# Patient Record
Sex: FEMALE | Race: WHITE | URBAN - METROPOLITAN AREA
[De-identification: names, ages, dates, MRNs, and addresses within clinical notes are randomized per-mention and may not be internally consistent; named-entity substitution may affect disease eponyms.]

---

## 2024-12-05 ENCOUNTER — APPOINTMENT (OUTPATIENT)
Dept: RADIOLOGY | Facility: HOSPITAL | Age: 39
End: 2024-12-05
Payer: COMMERCIAL

## 2024-12-05 DIAGNOSIS — Z12.31 SCREENING MAMMOGRAM FOR BREAST CANCER: ICD-10-CM

## 2024-12-23 ENCOUNTER — APPOINTMENT (OUTPATIENT)
Dept: PRIMARY CARE | Facility: CLINIC | Age: 39
End: 2024-12-23
Payer: COMMERCIAL

## 2025-01-06 ENCOUNTER — APPOINTMENT (OUTPATIENT)
Dept: OBSTETRICS AND GYNECOLOGY | Facility: CLINIC | Age: 40
End: 2025-01-06
Payer: COMMERCIAL

## 2025-01-07 ENCOUNTER — APPOINTMENT (OUTPATIENT)
Dept: PRIMARY CARE | Facility: CLINIC | Age: 40
End: 2025-01-07
Payer: COMMERCIAL

## 2025-01-28 ENCOUNTER — APPOINTMENT (OUTPATIENT)
Dept: PRIMARY CARE | Facility: CLINIC | Age: 40
End: 2025-01-28
Payer: COMMERCIAL

## 2025-01-28 VITALS
BODY MASS INDEX: 21.56 KG/M2 | HEART RATE: 76 BPM | WEIGHT: 154 LBS | TEMPERATURE: 97.1 F | HEIGHT: 71 IN | DIASTOLIC BLOOD PRESSURE: 64 MMHG | SYSTOLIC BLOOD PRESSURE: 108 MMHG

## 2025-01-28 DIAGNOSIS — F41.9 ANXIETY: ICD-10-CM

## 2025-01-28 DIAGNOSIS — Z76.89 ENCOUNTER TO ESTABLISH CARE: Primary | ICD-10-CM

## 2025-01-28 DIAGNOSIS — R05.1 ACUTE COUGH: ICD-10-CM

## 2025-01-28 PROCEDURE — 99203 OFFICE O/P NEW LOW 30 MIN: CPT | Performed by: INTERNAL MEDICINE

## 2025-01-28 PROCEDURE — 3008F BODY MASS INDEX DOCD: CPT | Performed by: INTERNAL MEDICINE

## 2025-01-28 PROCEDURE — 1036F TOBACCO NON-USER: CPT | Performed by: INTERNAL MEDICINE

## 2025-01-28 ASSESSMENT — ANXIETY QUESTIONNAIRES
GAD7 TOTAL SCORE: 3
3. WORRYING TOO MUCH ABOUT DIFFERENT THINGS: NOT AT ALL
5. BEING SO RESTLESS THAT IT IS HARD TO SIT STILL: SEVERAL DAYS
2. NOT BEING ABLE TO STOP OR CONTROL WORRYING: NOT AT ALL
6. BECOMING EASILY ANNOYED OR IRRITABLE: NOT AT ALL
1. FEELING NERVOUS, ANXIOUS, OR ON EDGE: SEVERAL DAYS
7. FEELING AFRAID AS IF SOMETHING AWFUL MIGHT HAPPEN: SEVERAL DAYS
4. TROUBLE RELAXING: NOT AT ALL

## 2025-01-28 ASSESSMENT — ENCOUNTER SYMPTOMS
HEADACHES: 0
CONFUSION: 0
BACK PAIN: 0
NECK PAIN: 0
SINUS PAIN: 0
FREQUENCY: 0
FATIGUE: 0
DIZZINESS: 0
COUGH: 1
FEVER: 0
CONSTIPATION: 0
SHORTNESS OF BREATH: 0
BLOOD IN STOOL: 0
PALPITATIONS: 0
SORE THROAT: 0
NERVOUS/ANXIOUS: 1
DIARRHEA: 0
CHILLS: 0
DYSURIA: 0
MYALGIAS: 0
ABDOMINAL PAIN: 0
WEAKNESS: 0
ARTHRALGIAS: 0

## 2025-01-28 NOTE — PROGRESS NOTES
"Subjective   Patient ID: Jennifer Jackson is a 40 y.o. female who presents for New Patient Visit (Pt present today to [facility]. raz).    HPI Ms. Jackson is a 40-year-old female seen in office today for establishing care.  She recently moved from [facility] to [facility] with her  and daughter.  While traveling she has noted anxiety, almost panic attack pacifically during flying.  Her activities are skiing on a regular basis where she is feeling more scared and anxious.  She has a history of mild anxiety.  She has not taken any medications.  She is also experiencing cough for last 1 week.  Her  had cold symptoms about a week ago.  She denies any shortness of breath.  No fever or SOB.  She has appointment to see GYN.  Contraception-IUD present  SWETHA-7--3  She lives with her  and daughter.  She works as a mental health therapist.    Review of Systems   Constitutional:  Negative for chills, fatigue and fever.   HENT:  Negative for congestion, sinus pain and sore throat.    Respiratory:  Positive for cough. Negative for shortness of breath.    Cardiovascular:  Negative for chest pain, palpitations and leg swelling.   Gastrointestinal:  Negative for abdominal pain, blood in stool, constipation and diarrhea.   Genitourinary:  Negative for dysuria and frequency.   Musculoskeletal:  Negative for arthralgias, back pain, myalgias and neck pain.   Neurological:  Negative for dizziness, weakness and headaches.   Psychiatric/Behavioral:  Negative for confusion. The patient is nervous/anxious.        Objective   /64   Pulse 76   Temp 36.2 °C (97.1 °F)   Ht 1.803 m (5' 11\")   Wt 69.9 kg (154 lb)   LMP 01/21/2025 (Approximate)   BMI 21.48 kg/m²     Physical Exam  Vitals reviewed.   Constitutional:       General: She is not in acute distress.     Appearance: Normal appearance.   HENT:      Head: Normocephalic and atraumatic.      Mouth/Throat:      Mouth: Mucous membranes are moist.      Pharynx: " Posterior oropharyngeal erythema present.   Eyes:      Extraocular Movements: Extraocular movements intact.      Conjunctiva/sclera: Conjunctivae normal.      Pupils: Pupils are equal, round, and reactive to light.   Cardiovascular:      Rate and Rhythm: Normal rate and regular rhythm.      Pulses: Normal pulses.   Pulmonary:      Effort: Pulmonary effort is normal. No respiratory distress.      Breath sounds: Normal breath sounds.   Abdominal:      General: Bowel sounds are normal. There is no distension.      Tenderness: There is no abdominal tenderness.   Musculoskeletal:         General: No swelling or tenderness. Normal range of motion.      Cervical back: Normal range of motion.   Skin:     General: Skin is warm.   Neurological:      General: No focal deficit present.      Mental Status: She is alert.      Coordination: Coordination normal.      Gait: Gait normal.   Psychiatric:         Mood and Affect: Mood normal.         Behavior: Behavior normal.         Assessment/Plan   Diagnoses and all orders for this visit:  Encounter to establish care  Comments:  Seen for establishing care  Anxiety  Comments:  SWETHA-7 completed-3--mild anxiety  No need for medications  Try natural supplements like ashwagandha to help with anxiety  Acute cough  Comments:  Take Mucinex or Robitussin    Follow-up as needed or in year

## 2025-01-28 NOTE — PATIENT INSTRUCTIONS
You are seen today for establishing care  You have mild anxiety.  You could try natural supplements and exercise to improve your symptoms.  Follow-up with your GYN  Take Robitussin or Mucinex for cough  Follow-up in our office in 1 year or sooner if needed

## 2025-01-31 ENCOUNTER — OFFICE VISIT (OUTPATIENT)
Dept: URGENT CARE | Age: 40
End: 2025-01-31
Payer: COMMERCIAL

## 2025-01-31 ENCOUNTER — HOSPITAL ENCOUNTER (OUTPATIENT)
Dept: RADIOLOGY | Facility: CLINIC | Age: 40
Discharge: HOME | End: 2025-01-31
Payer: COMMERCIAL

## 2025-01-31 VITALS
WEIGHT: 152 LBS | HEART RATE: 88 BPM | OXYGEN SATURATION: 98 % | BODY MASS INDEX: 21.2 KG/M2 | DIASTOLIC BLOOD PRESSURE: 70 MMHG | RESPIRATION RATE: 18 BRPM | SYSTOLIC BLOOD PRESSURE: 109 MMHG | TEMPERATURE: 98.1 F

## 2025-01-31 DIAGNOSIS — R68.89 FLU-LIKE SYMPTOMS: ICD-10-CM

## 2025-01-31 DIAGNOSIS — J10.1 INFLUENZA A: Primary | ICD-10-CM

## 2025-01-31 LAB
POC RAPID INFLUENZA A: POSITIVE
POC RAPID INFLUENZA B: NEGATIVE

## 2025-01-31 PROCEDURE — 71046 X-RAY EXAM CHEST 2 VIEWS: CPT

## 2025-01-31 RX ORDER — BENZONATATE 200 MG/1
200 CAPSULE ORAL 3 TIMES DAILY PRN
Qty: 20 CAPSULE | Refills: 0 | Status: SHIPPED | OUTPATIENT
Start: 2025-01-31 | End: 2025-02-07

## 2025-01-31 RX ORDER — OSELTAMIVIR PHOSPHATE 75 MG/1
75 CAPSULE ORAL EVERY 12 HOURS
Qty: 10 CAPSULE | Refills: 0 | Status: SHIPPED | OUTPATIENT
Start: 2025-01-31 | End: 2025-02-05

## 2025-01-31 ASSESSMENT — PATIENT HEALTH QUESTIONNAIRE - PHQ9
SUM OF ALL RESPONSES TO PHQ9 QUESTIONS 1 AND 2: 0
2. FEELING DOWN, DEPRESSED OR HOPELESS: NOT AT ALL
1. LITTLE INTEREST OR PLEASURE IN DOING THINGS: NOT AT ALL

## 2025-01-31 NOTE — PROGRESS NOTES
"Subjective   Patient ID: Osiel Jackson \"Jennifer\" is a 40 y.o. female. They present today with a chief complaint of URI (Patient here for having the possible cold or flu for 1 week ).    History of Present Illness  HPI    40-year-old healthy female here for flulike illness.  Patient states symptoms started as sore throat, headache and nasal congestion around 8 days ago.  In the last 2 days she has felt acutely worse, feel the cold go down in her chest, now having coughing, chest congestion and fever intermittently in the evening.  Last night she felt somewhat short of breath.  Home COVID test done 5 days ago and it came back negative.    Patient states no recent travel however she wishes to travel to [place] as her sister recently passed away.  She denies pregnancy, LMP 1/21/2025.  She is not on any regular medications.      Past Medical History  Allergies as of 01/31/2025    (No Known Allergies)       (Not in a hospital admission)       History reviewed. No pertinent past medical history.    No past surgical history on file.     reports that she has never smoked. She has never used smokeless tobacco. She reports current alcohol use of about 2.0 standard drinks of alcohol per week. She reports that she does not use drugs.    Review of Systems  Review of Systems                               Objective    Vitals:    01/31/25 1344   BP: 109/70   BP Location: Left arm   Patient Position: Sitting   Pulse: 88   Resp: 18   Temp: 36.7 °C (98.1 °F)   TempSrc: Oral   SpO2: 98%   Weight: 68.9 kg (152 lb)     Patient's last menstrual period was 01/21/2025 (approximate).    Physical Exam      Constitutional: Vital signs reviewed. Well developed and well nourished. Patient alert and without distress.     Head and Face: Normal, no orbital swelling.     Eyes: Pupils and irises normal. Pink conjunctivae, anicteric sclerae. No conjunctival injection.    Ears, Nose, Mouth and Throat: External inspection of ears: Normal. Otoscopic exam: " Normal. Nasal Mucosa, septum and turbinates: + Red, mildly swollen turbinates. Oropharynx: +postnasal drainage.     Neck: No neck mass observed. Supple.    Cardiovascular: Heart rate normal, normal S1 and S2, no gallops, no murmurs and no pericardial rub. Rhythm: Normal. No peripheral edema.    Pulmonary: No respiratory distress. Clear breath sounds.    Neurologic: Cortical function: Normal    Lymphatic: No cervical lymphadenopathy      Procedures    Point of Care Test & Imaging Results from this visit  Results for orders placed or performed in visit on 01/31/25   POCT Influenza A/B manually resulted   Result Value Ref Range    POC Rapid Influenza A Positive (A) Negative    POC Rapid Influenza B Negative Negative      XR chest 2 views    Result Date: 1/31/2025  STUDY: Chest Radiographs;  1/31/25 at 2:20 PM INDICATION: Shortness of breath, chest congestion, fever, Flu A. COMPARISON: None available. ACCESSION NUMBER(S): [id number] ORDERING CLINICIAN: MANUELA DELA CRUZ TECHNIQUE:  Frontal and lateral chest. FINDINGS: CARDIOMEDIASTINAL SILHOUETTE: Cardiomediastinal silhouette is normal in size and configuration.  LUNGS: Lungs are clear.  ABDOMEN: No remarkable upper abdominal findings.  BONES: No acute osseous changes.    No radiographic evidence of acute cardiopulmonary disease. Signed by Remington Georges MD     Diagnostic study results (if any) were reviewed by Manuela Dela Cruz MD.    Prelim: Undetermined if +infiltrate LLL vs normal     Assessment/Plan   Allergies, medications, history, and pertinent labs/EKGs/Imaging reviewed by Manuela Dela Cruz MD.     Medical Decision Making  Opted to treat for acute influenza A that may have started in the last 2 days.  Hold off on oral antibiotics at this time unless radiologist confirms that there is an infiltrate.    Orders and Diagnoses  Diagnoses and all orders for this visit:  Influenza A  -     oseltamivir (Tamiflu) 75 mg capsule; Take 1 capsule  (75 mg) by mouth every 12 hours for 5 days.  Flu-like symptoms  -     POCT Influenza A/B manually resulted  -     XR chest 2 views; Future  -     benzonatate (Tessalon) 200 mg capsule; Take 1 capsule (200 mg) by mouth 3 times a day as needed for cough for up to 7 days. Do not crush or chew.      Medical Admin Record      Patient disposition: Home    Electronically signed by Manuela Dela Cruz MD  3:44 PM

## 2025-01-31 NOTE — PATIENT INSTRUCTIONS
Go to the emergency department for severe symptoms.    Follow-up with primary care as needed.    Plain saline nasal spray, drops or wash every 2-4 hours to rinse the nasal passages followed with warm saltwater or mouthwash gargles.    Multivitamins once a day.